# Patient Record
Sex: FEMALE | Race: BLACK OR AFRICAN AMERICAN | NOT HISPANIC OR LATINO | Employment: UNEMPLOYED | URBAN - METROPOLITAN AREA
[De-identification: names, ages, dates, MRNs, and addresses within clinical notes are randomized per-mention and may not be internally consistent; named-entity substitution may affect disease eponyms.]

---

## 2024-10-08 ENCOUNTER — HOSPITAL ENCOUNTER (EMERGENCY)
Facility: HOSPITAL | Age: 19
Discharge: HOME | End: 2024-10-08
Attending: STUDENT IN AN ORGANIZED HEALTH CARE EDUCATION/TRAINING PROGRAM
Payer: COMMERCIAL

## 2024-10-08 ENCOUNTER — APPOINTMENT (OUTPATIENT)
Dept: RADIOLOGY | Facility: HOSPITAL | Age: 19
End: 2024-10-08
Payer: COMMERCIAL

## 2024-10-08 VITALS
OXYGEN SATURATION: 98 % | HEIGHT: 62 IN | BODY MASS INDEX: 31.47 KG/M2 | HEART RATE: 70 BPM | WEIGHT: 171 LBS | DIASTOLIC BLOOD PRESSURE: 75 MMHG | SYSTOLIC BLOOD PRESSURE: 123 MMHG | RESPIRATION RATE: 15 BRPM | TEMPERATURE: 98.6 F

## 2024-10-08 DIAGNOSIS — R09.A2 GLOBUS SENSATION: Primary | ICD-10-CM

## 2024-10-08 PROCEDURE — 2500000001 HC RX 250 WO HCPCS SELF ADMINISTERED DRUGS (ALT 637 FOR MEDICARE OP)

## 2024-10-08 PROCEDURE — 99283 EMERGENCY DEPT VISIT LOW MDM: CPT

## 2024-10-08 PROCEDURE — 70360 X-RAY EXAM OF NECK: CPT

## 2024-10-08 PROCEDURE — 70360 X-RAY EXAM OF NECK: CPT | Performed by: STUDENT IN AN ORGANIZED HEALTH CARE EDUCATION/TRAINING PROGRAM

## 2024-10-08 PROCEDURE — 2500000005 HC RX 250 GENERAL PHARMACY W/O HCPCS: Mod: SE

## 2024-10-08 RX ORDER — ACETAMINOPHEN 325 MG/1
975 TABLET ORAL ONCE
Status: COMPLETED | OUTPATIENT
Start: 2024-10-08 | End: 2024-10-08

## 2024-10-08 RX ORDER — LIDOCAINE HYDROCHLORIDE 20 MG/ML
1.25 SOLUTION OROPHARYNGEAL AS NEEDED
Qty: 4 ML | Refills: 0 | Status: SHIPPED | OUTPATIENT
Start: 2024-10-08 | End: 2024-10-09

## 2024-10-08 RX ORDER — OXYMETAZOLINE HCL 0.05 %
2 SPRAY, NON-AEROSOL (ML) NASAL EVERY 12 HOURS PRN
Status: DISCONTINUED | OUTPATIENT
Start: 2024-10-08 | End: 2024-10-08 | Stop reason: HOSPADM

## 2024-10-08 RX ORDER — ACETAMINOPHEN 325 MG/1
650 TABLET ORAL EVERY 6 HOURS PRN
Qty: 20 TABLET | Refills: 0 | Status: SHIPPED | OUTPATIENT
Start: 2024-10-08 | End: 2024-10-13

## 2024-10-08 RX ORDER — LIDOCAINE HYDROCHLORIDE 20 MG/ML
1 JELLY TOPICAL ONCE
Status: DISCONTINUED | OUTPATIENT
Start: 2024-10-08 | End: 2024-10-08 | Stop reason: HOSPADM

## 2024-10-08 RX ORDER — IBUPROFEN 600 MG/1
600 TABLET ORAL EVERY 6 HOURS PRN
Qty: 16 TABLET | Refills: 0 | Status: SHIPPED | OUTPATIENT
Start: 2024-10-08 | End: 2024-10-12

## 2024-10-08 RX ORDER — LIDOCAINE HYDROCHLORIDE 20 MG/ML
1.25 SOLUTION OROPHARYNGEAL ONCE
Status: COMPLETED | OUTPATIENT
Start: 2024-10-08 | End: 2024-10-08

## 2024-10-08 ASSESSMENT — PAIN - FUNCTIONAL ASSESSMENT: PAIN_FUNCTIONAL_ASSESSMENT: 0-10

## 2024-10-08 ASSESSMENT — PAIN SCALES - GENERAL
PAINLEVEL_OUTOF10: 5 - MODERATE PAIN
PAINLEVEL_OUTOF10: 6

## 2024-10-08 ASSESSMENT — COLUMBIA-SUICIDE SEVERITY RATING SCALE - C-SSRS
1. IN THE PAST MONTH, HAVE YOU WISHED YOU WERE DEAD OR WISHED YOU COULD GO TO SLEEP AND NOT WAKE UP?: NO
6. HAVE YOU EVER DONE ANYTHING, STARTED TO DO ANYTHING, OR PREPARED TO DO ANYTHING TO END YOUR LIFE?: NO
2. HAVE YOU ACTUALLY HAD ANY THOUGHTS OF KILLING YOURSELF?: NO

## 2024-10-08 ASSESSMENT — LIFESTYLE VARIABLES
HAVE PEOPLE ANNOYED YOU BY CRITICIZING YOUR DRINKING: NO
EVER HAD A DRINK FIRST THING IN THE MORNING TO STEADY YOUR NERVES TO GET RID OF A HANGOVER: NO
HAVE YOU EVER FELT YOU SHOULD CUT DOWN ON YOUR DRINKING: NO
EVER FELT BAD OR GUILTY ABOUT YOUR DRINKING: NO
TOTAL SCORE: 0

## 2024-10-08 ASSESSMENT — PAIN DESCRIPTION - ORIENTATION: ORIENTATION: RIGHT

## 2024-10-08 ASSESSMENT — PAIN DESCRIPTION - PAIN TYPE: TYPE: ACUTE PAIN

## 2024-10-08 ASSESSMENT — PAIN DESCRIPTION - LOCATION: LOCATION: THROAT

## 2024-10-08 ASSESSMENT — PAIN DESCRIPTION - ONSET: ONSET: ONGOING

## 2024-10-08 ASSESSMENT — PAIN DESCRIPTION - DESCRIPTORS: DESCRIPTORS: ACHING

## 2024-10-08 ASSESSMENT — PAIN DESCRIPTION - PROGRESSION: CLINICAL_PROGRESSION: NOT CHANGED

## 2024-10-08 NOTE — ED TRIAGE NOTES
Patient states she was eating with plastic fork when it broke. Patient states she thinks she swallowed one of the prongs of the plastic fork. Pt states this happened 3 days ago. Pt reporting throat pain, worse on right side than left. Pt denies difficulty breathing. Pt able to swallow and handle secretions.

## 2024-10-08 NOTE — ED PROVIDER NOTES
HPI   Chief Complaint   Patient presents with   • Swallowed Foreign Body       19-year-old female with no significant medical history presents for chief complaint of right-sided sore throat.  Occurred 3 days ago after possibly accidentally swallowing the broken prong of a plastic fork.  States that she feels that there may be something still there.  Endorses patent airway without discharge or bleeding.  Denies any swelling.  Denies cough, cold, congestion.  Denies runny nose.  Denies chills or myalgia.  Denies any known sick contacts.  Denies cough.  Denies chest pain or abdominal pain or rectal pain.  No hematochezia or melena.  No hematemesis endorsed.  Denies nausea or vomiting.  Denies dyspnea.              Patient History   No past medical history on file.  No past surgical history on file.  No family history on file.  Social History     Tobacco Use   • Smoking status: Not on file   • Smokeless tobacco: Not on file   Substance Use Topics   • Alcohol use: Not on file   • Drug use: Not on file       Physical Exam   ED Triage Vitals [10/08/24 0901]   Temperature Heart Rate Respirations BP   37 °C (98.6 °F) 70 15 123/75      Pulse Ox Temp Source Heart Rate Source Patient Position   98 % Oral Monitor --      BP Location FiO2 (%)     -- --       Physical Exam  Vitals and nursing note reviewed.   Constitutional:       General: She is not in acute distress.     Appearance: She is well-developed.   HENT:      Head: Normocephalic and atraumatic.      Comments: Mild tenderness to the right submandibular area on palpation without crepitus or deformity.  No lymphadenopathy noted.  Patent airway.  Midline uvula.  No oropharyngeal swelling or edema or erythema.  No bleeding or discharge.  No sublingual crepitus or swelling.  Normal phonation.  No obvious foreign body noted.  Eyes:      Conjunctiva/sclera: Conjunctivae normal.   Cardiovascular:      Rate and Rhythm: Normal rate and regular rhythm.      Heart sounds: No murmur  heard.  Pulmonary:      Effort: Pulmonary effort is normal. No respiratory distress.      Breath sounds: Normal breath sounds.   Abdominal:      Palpations: Abdomen is soft.      Tenderness: There is no abdominal tenderness.   Musculoskeletal:         General: No swelling.      Cervical back: Neck supple.   Skin:     General: Skin is warm and dry.      Capillary Refill: Capillary refill takes less than 2 seconds.   Neurological:      Mental Status: She is alert.   Psychiatric:         Mood and Affect: Mood normal.           ED Course & [facility]   ED Course as of 10/08/24 1058   Tue Oct 08, 2024   1052 Attending summary:  18 y/o healthy F presenting with sore throat 3 days after potentially swallowing part of a fork. Was eating with a plastic fork and she thinks a prong broke off and she swallowed it. Sore throat since. Able to swallow, no difficulty breathing, no drooling, no change in voice. Vitals unremarkable. Pt sitting comfortably in no pain, no resp distress. Tolerating her secretions without drooling, no hoarseness, no stridor audibly or on auscultation. Clear lungs bilaterally. Oropharyngeal exam without trauma or foreign body.     Based on her comfortable appearance and normal exam as above, very low suspicion for persistent foreign body in her larynx. No signs of airway obstruction. Most likely irritation from foreign body when swallowing it. Do not believe NP scope indicated based on the above. XR obtained prior to staffing with me, normal without foreign body. Discussed anticipatory guidance and symptom control at home and pt will f/up with PCP. She's comfortable with plan.  [SS]      ED Course User Index  [SS] Laila Dyson MD         Diagnoses as of 10/08/24 1058   Globus sensation                 No data recorded     Rajiv Coma Scale Score: 15 (10/08/24 0924 : Ishaan Kumar RN)                           Medical Decision Making  Vital signs reviewed, unremarkable at this time.  Patient is  well-appearing and in no apparent distress.  Speaks full sentences without difficulty.  Diagnostic testing performed.  Tylenol and viscous lidocaine given.  Physical exam reassuring.  Patent airway with no drooling.  X-ray showed no acute findings or obvious foreign bodies.  Airway appeared patent on x-ray as well.  Patient likely caused abrasion when accidentally swallowing the piece of plastic fork, causing her globus sensation with pain.  Endorses good improvement in pain transiently with lidocaine and Tylenol.  We did discuss risks and benefits of endoscope but ultimately decided against it, as her airway is patent without any signs of foreign body.  She appears comfortable without drooling or bleeding and is able to calmly and quietly sit in a chair using phone without issue.  Advised to follow-up with primary care to take meds as directed.  Advised to return with any new or worsening symptoms.  Patient agreed with this plan.  Discharged in stable condition.        Procedure  Procedures     Rusty Cain, HERIBERTO-JUANY  10/08/24 1058